# Patient Record
Sex: MALE | URBAN - METROPOLITAN AREA
[De-identification: names, ages, dates, MRNs, and addresses within clinical notes are randomized per-mention and may not be internally consistent; named-entity substitution may affect disease eponyms.]

---

## 2019-01-23 NOTE — PROGRESS NOTES
" SUBJECTIVE:   Jerome Vivas is an 33 year old year old man who presents for preventive health visit. He needs an admission physical to [facility] chemical dependency treatment facility and was admitted 1/14/19  Patient has a history of chemical dependency to meth, clean date 9/28/18  Last visit to the dentist was years ago  Last visit to an eye provider was never       Healthy Habits:    Amount of exercise or daily activities, outside of work: lifting every other day    Problems taking medications regularly not applicable    Medication side effects: No    Have you had an eye exam in the past two years? no    Do you see a dentist twice per year? no    Do you have sleep apnea, excessive snoring or daytime drowsiness?no    Water- \"not enough\" at least 16oz    Caffeine- little to none    Sleep- 4.5-5 hours    Calcium- 24oz cow's milk per day    HPI:  Methamphetamine abuse:  Was using meth almost daily since age 18. Clean since 9/28/19. Has been through substance abuse rehabilitation in the past, but reports his life is different now and he is more motivated to stay clean. He expresses worry over the years of \"abuse\" on his body and requests a thorough exam today.     Nicotine dependence:  Has smoked 1ppd since age 13. Interested in quitting with nicotine replacement products, prefers gum over the patch.     Anxiety:  Has had increased anxiety symptoms since becoming sober. Reports a racing mind at night and feeling worried and anxious, having trouble relaxing, being restless, and irritable intermittently throughout the day. He has never been treated for anxiety in the past. He reports his care team at [facility] has suggested therapy.      Today's PHQ-2 Score:   PHQ-2 Score:     PHQ-2 ( 1999 Pfizer) 1/25/2019   Q1: Little interest or pleasure in doing things 1   Q2: Feeling down, depressed or hopeless 2   PHQ-2 Score 3     PHQ-9 score:    PHQ-9 SCORE 1/25/2019   PHQ-9 Total Score 10     WILLIAM-7 SCORE 1/25/2019   Total Score 14 "         Abuse: Current or Past(Physical, Sexual or Emotional)- No  Do you feel safe in your environment - Yes    History reviewed. No pertinent past medical history.  History reviewed. No pertinent surgical history.  Social History     Socioeconomic History     Marital status: Single     Spouse name: Not on file     Number of children: Not on file     Years of education: Not on file     Highest education level: Not on file   Social Needs     Financial resource strain: Not on file     Food insecurity - worry: Not on file     Food insecurity - inability: Not on file     Transportation needs - medical: Not on file     Transportation needs - non-medical: Not on file   Occupational History     Not on file   Tobacco Use     Smoking status: Not on file   Substance and Sexual Activity     Alcohol use: Not on file     Drug use: Not on file     Sexual activity: Not on file   Other Topics Concern     Not on file   Social History Narrative     Not on file     History reviewed. No pertinent family history.    If you drink alcohol do you typically have >3 drinks per day or >10 drinks per week? No                     Reviewed orders with patient.  Reviewed health maintenance and updated orders accordingly - Yes         No Known Allergies     ROS:  Constitutional: no fevers, chills, night sweats. Has increased in weight since becoming sober   Eyes: no vision change, diplopia or red eyes   Ears, Nose, Mouth, Throat: Muffled hearing to left ear, no tinnitus, no epistaxis or nasal discharge, no oral lesions, throat clear   Cardiovascular: no chest pain, palpitations, or pain with walking, no orthopnea or PND   Respiratory: no dyspnea, cough, shortness of breath or wheezing   GI: no nausea, vomiting, diarrhea or constipation, no abdominal pain   : no change in urine, no dysuria or hematuria, no sexual dysfunction   Musculoskeletal: no joint or muscle pain or swelling   Integumentary: no concerning lesions or moles   Neuro: no loss of  "strength or sensation, no numbness or tingling, no tremor, no dizziness, no headache, no gait disturbance   Endo: no polyuria or polydipsia, no temperature intolerance   Heme/Lymph: no concerning bumps, no bleeding problems   Allergy: seasonal allergies, typically in spring  Psych: anxious, denies feeling depressed. Occasional difficulty falling asleep, occasional waking in the night due to racing thoughts    OBJECTIVE:   /77   Pulse 80   Temp 97.8  F (36.6  C) (Oral)   Resp 16   Ht 1.641 m (5' 4.6\")   Wt 83 kg (182 lb 14.4 oz)   SpO2 97%   BMI 30.81 kg/m    EXAM:  GENERAL: healthy, alert and no distress  EYES: Eyes grossly normal to inspection, PERRL and conjunctivae and sclerae normal  HENT: normal cephalic/atraumatic, left ear: occluded with wax, nose and mouth without ulcers or lesions, oropharynx clear and oral mucous membranes moist. Left TM easily visualized after irrigation completed. Patient tolerated well.   NECK: no adenopathy, no asymmetry, masses, or scars and thyroid normal to palpation  RESP: lungs clear to auscultation - no rales, rhonchi or wheezes  CV: regular rate and rhythm, normal S1 S2, no S3 or S4, no murmur, click or rub, no peripheral edema and peripheral pulses strong  ABDOMEN: soft, nontender, no hepatosplenomegaly, no masses and bowel sounds normal   (male): normal male genitalia without lesions or urethral discharge, no hernia  MS: no gross musculoskeletal defects noted, no edema  SKIN: no suspicious lesions or rashes  NEURO: Normal strength and tone, mentation intact and speech normal  PSYCH: mentation appears normal, affect normal/bright  LYMPH: no cervical, supraclavicular, axillary, or inguinal adenopathy    ASSESSMENT/PLAN:   1. Encounter for preventive health examination  Generally healthy male. Discussed weight management strategies, including portion control and exercise, as patient has put on weight since becoming sober.   No regular health care in past several " years. Up to date on Tdap.   - Basic metabolic panel  - Hepatitis C antibody  - Hepatitis B Surface Antibody  - Lipid panel reflex to direct LDL Fasting  - TSH with free T4 reflex  - HC FLU VAC PRESRV FREE QUAD SPLIT VIR 3+YRS IM  - Pneumococcal vaccine 23 valent PPSV23  (Pneumovax) [[id number]]  - ADMIN: Vaccine, Initial (05673)  - CBC with platelets    2. Impacted cerumen of left ear  Offered debrox drops, but patient declined  - REMOVE IMPACTED CERUMEN    3. Cigarette nicotine dependence without complication  Asking for help to quit. Interested in nicotine gum.   Follow up in 6 weeks   - HC FLU VAC PRESRV FREE QUAD SPLIT VIR 3+YRS IM  - Pneumococcal vaccine 23 valent PPSV23  (Pneumovax) [[id number]]  - nicotine (NICORETTE) 2 MG gum; Place 1 each (2 mg) inside cheek as needed for smoking cessation  Dispense: 150 tablet; Refill: 3    4. Routine screening for STI (sexually transmitted infection)  - HIV Antigen Antibody Combo  - NEISSERIA GONORRHOEA PCR  - CHLAMYDIA TRACHOMATIS PCR  - Treponema Abs w Reflex to RPR and Titer    5. History of intravenous drug use in remission  - Hepatitis C antibody  - HIV Antigen Antibody Combo  - CBC with platelets    6. Anxiety  WILLIAM-7 score of 14 indicated moderate anxiety. Discussed treatment options of therapy and medication. Patient interested in starting with therapy, which is available through [facility]. Return to clinic if anxiety is not managed with therapy to discuss medication.     7. History of methamphetamine abuse  Clean date 9-28-18, currently in treatment for substance abuse. Mostly smoked meth, but had begun injecting it in the last couple months of use. Encouraged to continue treatment at [facility].       COUNSELING:     Tobacco Cessation Action Plan: Medication Therapy Management  (Referral to MTM)  Self help information given to patient as well as pharmacotherapy with nicotine gum  Estimated body mass index is 30.81 kg/(m^2) as calculated from the following:    Height as of this  "encounter: 5' 4.6\".    Weight as of this encounter: 83 kg/m2  Weight management plan: Discussed healthy diet and exercise guidelines    Counseling Resources:  ATP IV Guidelines  Pooled Cohorts Equation Calculator  ICSI Preventive Guidelines  Dietary Guidelines for Americans, 2010  USDA's MyPlate  ASA Prophylaxis  Lung CA Screening    Options for treatment and follow-up care were reviewed with the patient. Jerome Vivas   engaged in the decision making process and verbalized understanding of the options discussed and agreed with the final plan.    I was present with the [facility] [place] student who participated in the service and in the documentation of the services provided. I have verified the history and personally performed the physical exam and medical decision making, as documented by the student and edited by me    CORINNE Ramos CNP  01/25/19  2:23 PM      [employer]  "

## 2019-01-25 ENCOUNTER — OFFICE VISIT (OUTPATIENT)
Dept: FAMILY MEDICINE | Facility: CLINIC | Age: 34
End: 2019-01-25
Payer: MEDICAID

## 2019-01-25 VITALS
WEIGHT: 182.9 LBS | SYSTOLIC BLOOD PRESSURE: 120 MMHG | RESPIRATION RATE: 16 BRPM | DIASTOLIC BLOOD PRESSURE: 77 MMHG | BODY MASS INDEX: 30.47 KG/M2 | HEIGHT: 65 IN | OXYGEN SATURATION: 97 % | HEART RATE: 80 BPM | TEMPERATURE: 97.8 F

## 2019-01-25 DIAGNOSIS — Z11.3 ROUTINE SCREENING FOR STI (SEXUALLY TRANSMITTED INFECTION): ICD-10-CM

## 2019-01-25 DIAGNOSIS — F41.9 ANXIETY: ICD-10-CM

## 2019-01-25 DIAGNOSIS — F17.210 CIGARETTE NICOTINE DEPENDENCE WITHOUT COMPLICATION: ICD-10-CM

## 2019-01-25 DIAGNOSIS — H61.22 IMPACTED CERUMEN OF LEFT EAR: ICD-10-CM

## 2019-01-25 DIAGNOSIS — Z00.00 ENCOUNTER FOR PREVENTIVE HEALTH EXAMINATION: Primary | ICD-10-CM

## 2019-01-25 DIAGNOSIS — Z87.898 HISTORY OF INTRAVENOUS DRUG USE IN REMISSION: ICD-10-CM

## 2019-01-25 DIAGNOSIS — F15.11 HISTORY OF METHAMPHETAMINE ABUSE (H): ICD-10-CM

## 2019-01-25 LAB
ANION GAP SERPL CALCULATED.3IONS-SCNC: 8 MMOL/L (ref 3–14)
BUN SERPL-MCNC: 24 MG/DL (ref 7–30)
CALCIUM SERPL-MCNC: 9.1 MG/DL (ref 8.5–10.1)
CHLORIDE SERPL-SCNC: 106 MMOL/L (ref 94–109)
CHOLEST SERPL-MCNC: 178 MG/DL
CO2 SERPL-SCNC: 22 MMOL/L (ref 20–32)
CREAT SERPL-MCNC: 0.76 MG/DL (ref 0.66–1.25)
ERYTHROCYTE [DISTWIDTH] IN BLOOD BY AUTOMATED COUNT: 12.3 % (ref 10–15)
GFR SERPL CREATININE-BSD FRML MDRD: >90 ML/MIN/{1.73_M2}
GLUCOSE SERPL-MCNC: 87 MG/DL (ref 70–99)
HCT VFR BLD AUTO: 42.3 % (ref 40–53)
HDLC SERPL-MCNC: 34 MG/DL
HGB BLD-MCNC: 14.3 G/DL (ref 13.3–17.7)
LDLC SERPL CALC-MCNC: 124 MG/DL
MCH RBC QN AUTO: 31.4 PG (ref 26.5–33)
MCHC RBC AUTO-ENTMCNC: 33.8 G/DL (ref 31.5–36.5)
MCV RBC AUTO: 93 FL (ref 78–100)
NONHDLC SERPL-MCNC: 144 MG/DL
PLATELET # BLD AUTO: 210 10E9/L (ref 150–450)
POTASSIUM SERPL-SCNC: 4.9 MMOL/L (ref 3.4–5.3)
RBC # BLD AUTO: 4.55 10E12/L (ref 4.4–5.9)
SODIUM SERPL-SCNC: 136 MMOL/L (ref 133–144)
TRIGL SERPL-MCNC: 96 MG/DL
TSH SERPL DL<=0.005 MIU/L-ACNC: 1.8 MU/L (ref 0.4–4)
WBC # BLD AUTO: 5.7 10E9/L (ref 4–11)

## 2019-01-25 SDOH — HEALTH STABILITY: MENTAL HEALTH: HOW OFTEN DO YOU HAVE A DRINK CONTAINING ALCOHOL?: NEVER

## 2019-01-25 ASSESSMENT — PATIENT HEALTH QUESTIONNAIRE - PHQ9
SUM OF ALL RESPONSES TO PHQ QUESTIONS 1-9: 10
5. POOR APPETITE OR OVEREATING: MORE THAN HALF THE DAYS

## 2019-01-25 ASSESSMENT — ANXIETY QUESTIONNAIRES
6. BECOMING EASILY ANNOYED OR IRRITABLE: MORE THAN HALF THE DAYS
5. BEING SO RESTLESS THAT IT IS HARD TO SIT STILL: MORE THAN HALF THE DAYS
GAD7 TOTAL SCORE: 14
3. WORRYING TOO MUCH ABOUT DIFFERENT THINGS: MORE THAN HALF THE DAYS
7. FEELING AFRAID AS IF SOMETHING AWFUL MIGHT HAPPEN: MORE THAN HALF THE DAYS
1. FEELING NERVOUS, ANXIOUS, OR ON EDGE: MORE THAN HALF THE DAYS
2. NOT BEING ABLE TO STOP OR CONTROL WORRYING: MORE THAN HALF THE DAYS
IF YOU CHECKED OFF ANY PROBLEMS ON THIS QUESTIONNAIRE, HOW DIFFICULT HAVE THESE PROBLEMS MADE IT FOR YOU TO DO YOUR WORK, TAKE CARE OF THINGS AT HOME, OR GET ALONG WITH OTHER PEOPLE: SOMEWHAT DIFFICULT

## 2019-01-25 ASSESSMENT — MIFFLIN-ST. JEOR: SCORE: 1700.16

## 2019-01-25 NOTE — PATIENT INSTRUCTIONS
ASSESSMENT/PLAN:   1. Encounter for preventive health examination  Generally healthy male without health concerns  No regular health care in past several years  - Basic metabolic panel  - Hepatitis C antibody  - Hepatitis B Surface Antibody  - Lipid panel reflex to direct LDL Fasting  - TSH with free T4 reflex  - HC FLU VAC PRESRV FREE QUAD SPLIT VIR 3+YRS IM  - Pneumococcal vaccine 23 valent PPSV23  (Pneumovax) [[id number]]  - ADMIN: Vaccine, Initial (52873)  - CBC with platelets    2. Impacted cerumen of left ear  Offered debrox drops, but patient declines  - REMOVE IMPACTED CERUMEN    3. Cigarette nicotine dependence without complication  Asking for help to quit. Interested in nicotine gum.   Follow up in 6 weeks   - HC FLU VAC PRESRV FREE QUAD SPLIT VIR 3+YRS IM  - Pneumococcal vaccine 23 valent PPSV23  (Pneumovax) [[id number]]  - nicotine (NICORETTE) 2 MG gum; Place 1 each (2 mg) inside cheek as needed for smoking cessation  Dispense: 150 tablet; Refill: 3    4. Routine screening for STI (sexually transmitted infection)  - HIV Antigen Antibody Combo  - NEISSERIA GONORRHOEA PCR  - CHLAMYDIA TRACHOMATIS PCR  - Treponema Abs w Reflex to RPR and Titer    5. History of intravenous drug use in remission  - Hepatitis C antibody  - HIV Antigen Antibody Combo  - CBC with platelets    6. Anxiety  WILLIAM-7 score of 14 indicated moderate anxiety. Discussed treatment options of therapy and medication. Patient interested in starting with therapy, which is available through [facility]. Return to clinic if anxiety is not managed with therapy to discuss medication.     7. History of methamphetamine abuse  Clean date 9-28-18, currently in treatment for substance abuse.

## 2019-01-25 NOTE — NURSING NOTE
Performed Left ear lavage with moderate wax removed. Patient tolerated procedure well.    Juliet Lara, CMA

## 2019-01-25 NOTE — NURSING NOTE
"33 year old  Chief Complaint   Patient presents with     Physical     Checking prostate, help with quitting smoking       Blood pressure 120/77, pulse 80, temperature 97.8  F (36.6  C), temperature source Oral, resp. rate 16, height 1.641 m (5' 4.6\"), weight 83 kg (182 lb 14.4 oz), SpO2 97 %. Body mass index is 30.81 kg/m .  BP completed using cuff size:    There is no problem list on file for this patient.      Wt Readings from Last 2 Encounters:   01/25/19 83 kg (182 lb 14.4 oz)     BP Readings from Last 3 Encounters:   01/25/19 120/77       No Known Allergies    No current outpatient medications on file.     No current facility-administered medications for this visit.        Social History     Tobacco Use     Smoking status: Current Every Day Smoker     Smokeless tobacco: Never Used   Substance Use Topics     Alcohol use: Not on file     Drug use: Not on file       Honoring Choices - Health Care Directive Guide offered to patient at time of visit.    Health Maintenance Due   Topic Date Due     PHQ-2 Q1 YR  06/03/1998     HIV SCREEN (SYSTEM ASSIGNED)  06/03/2004     INFLUENZA VACCINE (1) 09/01/2018       Immunization History   Administered Date(s) Administered     TDAP Vaccine (Adacel) 03/19/2012     TDAP Vaccine (Boostrix) 09/17/2014       No results found for: PAP      No lab results found.    PHQ-2 ( 1999 Pfizer) 1/25/2019   Q1: Little interest or pleasure in doing things 1   Q2: Feeling down, depressed or hopeless 2   PHQ-2 Score 3       PHQ-9 SCORE 1/25/2019   PHQ-9 Total Score 10       WILLIAM-7 SCORE 1/25/2019   Total Score 14       No flowsheet data found.      Juliet Lara CMA  January 25, 2019 8:12 AM    "

## 2019-01-26 LAB — T PALLIDUM AB SER QL: NONREACTIVE

## 2019-01-26 ASSESSMENT — ANXIETY QUESTIONNAIRES: GAD7 TOTAL SCORE: 14

## 2019-01-27 LAB
C TRACH DNA SPEC QL NAA+PROBE: NEGATIVE
SPECIMEN SOURCE: NORMAL

## 2019-01-28 ENCOUNTER — TELEPHONE (OUTPATIENT)
Dept: FAMILY MEDICINE | Facility: CLINIC | Age: 34
End: 2019-01-28

## 2019-01-28 DIAGNOSIS — Z00.00 PREVENTATIVE HEALTH CARE: ICD-10-CM

## 2019-01-28 DIAGNOSIS — A54.9 GONORRHEA IN MALE: Primary | ICD-10-CM

## 2019-01-28 LAB
HBV SURFACE AB SERPL IA-ACNC: 0 M[IU]/ML
HCV AB SERPL QL IA: NONREACTIVE
HIV 1+2 AB+HIV1 P24 AG SERPL QL IA: NONREACTIVE
N GONORRHOEA DNA SPEC QL NAA+PROBE: POSITIVE
SPECIMEN SOURCE: ABNORMAL

## 2019-01-28 RX ORDER — AZITHROMYCIN 500 MG/1
1000 TABLET, FILM COATED ORAL ONCE
Qty: 2 TABLET | Refills: 0 | Status: SHIPPED | OUTPATIENT
Start: 2019-01-28 | End: 2019-01-28

## 2019-01-28 RX ORDER — CEFTRIAXONE SODIUM 250 MG/1
250 INJECTION, POWDER, FOR SOLUTION INTRAMUSCULAR; INTRAVENOUS ONCE
Qty: 2.5 ML | Refills: 0 | OUTPATIENT
Start: 2019-01-28 | End: 2019-01-28

## 2019-01-28 NOTE — TELEPHONE ENCOUNTER
Called and left message at  [facility] requesting patient to return call for results.   Manasa Hunt, APRN CNP  January 28, 2019

## 2019-01-29 ENCOUNTER — ALLIED HEALTH/NURSE VISIT (OUTPATIENT)
Dept: FAMILY MEDICINE | Facility: CLINIC | Age: 34
End: 2019-01-29
Payer: MEDICAID

## 2019-01-29 DIAGNOSIS — A54.9 GONORRHEA IN MALE: Primary | ICD-10-CM

## 2019-01-29 DIAGNOSIS — Z00.00 PREVENTATIVE HEALTH CARE: ICD-10-CM

## 2019-01-29 RX ORDER — CEFTRIAXONE SODIUM 250 MG
250 VIAL (EA) INJECTION ONCE
Status: COMPLETED | OUTPATIENT
Start: 2019-01-29 | End: 2019-01-29

## 2019-01-29 RX ADMIN — Medication 250 MG: at 13:30

## 2019-01-29 NOTE — TELEPHONE ENCOUNTER
[facility] requesting patient to return call to clinic to discuss results from physical.   Manasa Hunt, APRN CNP  January 29, 2019

## 2019-01-29 NOTE — TELEPHONE ENCOUNTER
Jerome returned call, informed of positive gonorrhea on exam and no hepatitis B immunity. Jerome reported he has started taking Azithromycin as prescribed and told he needs to come to [facility] clinic to receive one time dose of rocephin IM to complete treatment. Jerome stated he would speak with Kourtney Ceja RN and set up a time to come down to clinic to get this. Education provided for patient to use safe sex practices and condoms with each partner to prevent ben another STI in the future.   Jerome agreed to also start hepatitis B vaccination series after reviewing how it can be contracted and what side effects can develop from hepatitis B. Orders placed for patient to start series when he comes to clinic for rocephin injection.   Manasa Hunt, APRN CNP  January 29, 2019

## 2019-01-29 NOTE — NURSING NOTE
The following medication was ordered by Manasa Hunt CNP and given:     MEDICATION: Rocephin 500mg and Lidocaine 1cc  ROUTE: IM  SITE: LUQ - Gluteus  DOSE: 250mg  LOT #: [id number]  :  Gucash for [employer]  EXPIRATION DATE:  04/01/2020  NDC#: 3774-8413-36     Was entire vial of medication used? No, The remainder .65ML of 1.3ML was discarded as unavoidable waste.    Manasa Hunt CNP onsite and available for questions at time of injection.     Mery Lyles CMA